# Patient Record
Sex: FEMALE | Race: WHITE | NOT HISPANIC OR LATINO | Employment: FULL TIME | URBAN - METROPOLITAN AREA
[De-identification: names, ages, dates, MRNs, and addresses within clinical notes are randomized per-mention and may not be internally consistent; named-entity substitution may affect disease eponyms.]

---

## 2017-04-04 ENCOUNTER — HISTORICAL (OUTPATIENT)
Dept: ADMINISTRATIVE | Facility: HOSPITAL | Age: 37
End: 2017-04-04

## 2017-04-05 ENCOUNTER — TELEPHONE (OUTPATIENT)
Dept: TRANSPLANT | Facility: CLINIC | Age: 37
End: 2017-04-05

## 2017-04-10 ENCOUNTER — TELEPHONE (OUTPATIENT)
Dept: TRANSPLANT | Facility: CLINIC | Age: 37
End: 2017-04-10

## 2017-04-10 NOTE — TELEPHONE ENCOUNTER
Follow up to call and instructions by Sana for pt to report to the ER for the pain she was having. Pt has not been seen at [facility] since  2015. Robinsonm.

## 2017-04-10 NOTE — TELEPHONE ENCOUNTER
----- Message from Himanshu Russell sent at 4/5/2017  4:04 PM CDT -----  Pt called to say that she is a lot of apin and would like to been soon by Dr. seal

## 2017-04-11 ENCOUNTER — DOCUMENTATION ONLY (OUTPATIENT)
Dept: TRANSPLANT | Facility: CLINIC | Age: 37
End: 2017-04-11

## 2017-04-11 NOTE — NURSING
Spoke to pt re her recent ER visit for pain.   Imaging was negative. Pt had colonoscopy due to complaints of nausea, diarrhea, weight loss. I informed her that these are not symptoms of FNH. All documentation by Dr Acevedo in 2015 notes pain not from FNH.  I spoke to the referring, the are pending colon bx report.  PT chart will be rev. With Dr Ruff but informed pt others causes of her symptoms should be investigated.  Pt agreed.

## 2017-04-13 ENCOUNTER — HISTORICAL (OUTPATIENT)
Dept: RADIOLOGY | Facility: HOSPITAL | Age: 37
End: 2017-04-13

## 2017-04-19 ENCOUNTER — HISTORICAL (OUTPATIENT)
Dept: RADIOLOGY | Facility: HOSPITAL | Age: 37
End: 2017-04-19

## 2017-05-11 ENCOUNTER — TELEPHONE (OUTPATIENT)
Dept: TRANSPLANT | Facility: CLINIC | Age: 37
End: 2017-05-11

## 2017-07-20 ENCOUNTER — HISTORICAL (OUTPATIENT)
Dept: ADMINISTRATIVE | Facility: HOSPITAL | Age: 37
End: 2017-07-20

## 2017-07-21 ENCOUNTER — HISTORICAL (OUTPATIENT)
Dept: RADIOLOGY | Facility: HOSPITAL | Age: 37
End: 2017-07-21

## 2017-07-21 LAB — POC CREATININE: 0.7 MG/DL (ref 0.6–1.3)

## 2017-07-31 ENCOUNTER — TELEPHONE (OUTPATIENT)
Dept: TRANSPLANT | Facility: CLINIC | Age: 37
End: 2017-07-31

## 2017-07-31 NOTE — TELEPHONE ENCOUNTER
----- Message from Cyndy Arreguin sent at 7/31/2017  8:04 AM CDT -----  Contact: patient   Patient states she has been calling to get her appt scheduled. Please call

## 2017-07-31 NOTE — TELEPHONE ENCOUNTER
Patient called in reference to her complaint of abdominal pain.  Patient reports having pain in her mid back that travel upward.  Patient does not report ant  dark urine change in skin color or distended abdomen.  Patient reports that she has not seen her PCP in a while.  Patient instructed to see PCP about symptoms and get current labs and imaging for review.  Patient told to call after this has been done if necessary.

## 2017-08-23 ENCOUNTER — HISTORICAL (OUTPATIENT)
Dept: ADMINISTRATIVE | Facility: HOSPITAL | Age: 37
End: 2017-08-23

## 2017-10-31 ENCOUNTER — HISTORICAL (OUTPATIENT)
Dept: RADIOLOGY | Facility: HOSPITAL | Age: 37
End: 2017-10-31

## 2019-03-19 ENCOUNTER — HISTORICAL (OUTPATIENT)
Dept: LAB | Facility: HOSPITAL | Age: 39
End: 2019-03-19

## 2019-03-19 LAB
ABS NEUT (OLG): 3.16 X10(3)/MCL (ref 2.1–9.2)
ALBUMIN SERPL-MCNC: 4.2 GM/DL (ref 3.4–5)
ALBUMIN/GLOB SERPL: 1.2 {RATIO}
ALP SERPL-CCNC: 119 UNIT/L (ref 38–126)
ALT SERPL-CCNC: 70 UNIT/L (ref 12–78)
AST SERPL-CCNC: 22 UNIT/L (ref 15–37)
BASOPHILS # BLD AUTO: 0 X10(3)/MCL (ref 0–0.2)
BASOPHILS NFR BLD AUTO: 0 %
BILIRUB SERPL-MCNC: 0.5 MG/DL (ref 0.2–1)
BILIRUBIN DIRECT+TOT PNL SERPL-MCNC: 0.1 MG/DL (ref 0–0.2)
BILIRUBIN DIRECT+TOT PNL SERPL-MCNC: 0.4 MG/DL (ref 0–0.8)
BUN SERPL-MCNC: 13 MG/DL (ref 7–18)
CALCIUM SERPL-MCNC: 8.5 MG/DL (ref 8.5–10.1)
CHLORIDE SERPL-SCNC: 105 MMOL/L (ref 98–107)
CO2 SERPL-SCNC: 27 MMOL/L (ref 21–32)
CREAT SERPL-MCNC: 0.83 MG/DL (ref 0.55–1.02)
CRP SERPL HS-MCNC: 1.06 MG/L (ref 0–3)
EOSINOPHIL # BLD AUTO: 0.2 X10(3)/MCL (ref 0–0.9)
EOSINOPHIL NFR BLD AUTO: 3 %
ERYTHROCYTE [DISTWIDTH] IN BLOOD BY AUTOMATED COUNT: 12 % (ref 11.5–17)
GLOBULIN SER-MCNC: 3.5 GM/DL (ref 2.4–3.5)
GLUCOSE SERPL-MCNC: 78 MG/DL (ref 74–106)
HCT VFR BLD AUTO: 44.4 % (ref 37–47)
HGB BLD-MCNC: 14.3 GM/DL (ref 12–16)
LYMPHOCYTES # BLD AUTO: 2.1 X10(3)/MCL (ref 0.6–4.6)
LYMPHOCYTES NFR BLD AUTO: 36 %
MCH RBC QN AUTO: 32.8 PG (ref 27–31)
MCHC RBC AUTO-ENTMCNC: 32.2 GM/DL (ref 33–36)
MCV RBC AUTO: 101.8 FL (ref 80–94)
MONOCYTES # BLD AUTO: 0.4 X10(3)/MCL (ref 0.1–1.3)
MONOCYTES NFR BLD AUTO: 6 %
NEUTROPHILS # BLD AUTO: 3.16 X10(3)/MCL (ref 2.1–9.2)
NEUTROPHILS NFR BLD AUTO: 54 %
PLATELET # BLD AUTO: 193 X10(3)/MCL (ref 130–400)
PMV BLD AUTO: 10.9 FL (ref 9.4–12.4)
POTASSIUM SERPL-SCNC: 3.5 MMOL/L (ref 3.5–5.1)
PROT SERPL-MCNC: 7.7 GM/DL (ref 6.4–8.2)
RBC # BLD AUTO: 4.36 X10(6)/MCL (ref 4.2–5.4)
SODIUM SERPL-SCNC: 139 MMOL/L (ref 136–145)
WBC # SPEC AUTO: 5.8 X10(3)/MCL (ref 4.5–11.5)

## 2019-04-08 ENCOUNTER — HISTORICAL (OUTPATIENT)
Dept: SURGERY | Facility: HOSPITAL | Age: 39
End: 2019-04-08

## 2022-04-10 ENCOUNTER — HISTORICAL (OUTPATIENT)
Dept: ADMINISTRATIVE | Facility: HOSPITAL | Age: 42
End: 2022-04-10
Payer: COMMERCIAL

## 2022-04-24 VITALS
HEIGHT: 66 IN | DIASTOLIC BLOOD PRESSURE: 78 MMHG | WEIGHT: 145 LBS | BODY MASS INDEX: 23.3 KG/M2 | SYSTOLIC BLOOD PRESSURE: 116 MMHG

## 2022-05-04 ENCOUNTER — OFFICE VISIT (OUTPATIENT)
Dept: NEUROLOGY | Facility: CLINIC | Age: 42
End: 2022-05-04
Payer: COMMERCIAL

## 2022-05-04 VITALS
SYSTOLIC BLOOD PRESSURE: 122 MMHG | DIASTOLIC BLOOD PRESSURE: 76 MMHG | WEIGHT: 130 LBS | BODY MASS INDEX: 20.89 KG/M2 | HEIGHT: 66 IN

## 2022-05-04 DIAGNOSIS — F51.01 PRIMARY INSOMNIA: ICD-10-CM

## 2022-05-04 DIAGNOSIS — G44.011 INTRACTABLE EPISODIC CLUSTER HEADACHE: Primary | ICD-10-CM

## 2022-05-04 DIAGNOSIS — G43.701 CHRONIC MIGRAINE WITHOUT AURA WITH STATUS MIGRAINOSUS, NOT INTRACTABLE: ICD-10-CM

## 2022-05-04 PROCEDURE — 3074F PR MOST RECENT SYSTOLIC BLOOD PRESSURE < 130 MM HG: ICD-10-PCS | Mod: CPTII,S$GLB,, | Performed by: NURSE PRACTITIONER

## 2022-05-04 PROCEDURE — 3078F PR MOST RECENT DIASTOLIC BLOOD PRESSURE < 80 MM HG: ICD-10-PCS | Mod: CPTII,S$GLB,, | Performed by: NURSE PRACTITIONER

## 2022-05-04 PROCEDURE — 99214 OFFICE O/P EST MOD 30 MIN: CPT | Mod: S$GLB,,, | Performed by: NURSE PRACTITIONER

## 2022-05-04 PROCEDURE — 1159F MED LIST DOCD IN RCRD: CPT | Mod: CPTII,S$GLB,, | Performed by: NURSE PRACTITIONER

## 2022-05-04 PROCEDURE — 1160F PR REVIEW ALL MEDS BY PRESCRIBER/CLIN PHARMACIST DOCUMENTED: ICD-10-PCS | Mod: CPTII,S$GLB,, | Performed by: NURSE PRACTITIONER

## 2022-05-04 PROCEDURE — 3074F SYST BP LT 130 MM HG: CPT | Mod: CPTII,S$GLB,, | Performed by: NURSE PRACTITIONER

## 2022-05-04 PROCEDURE — 99999 PR PBB SHADOW E&M-EST. PATIENT-LVL III: ICD-10-PCS | Mod: PBBFAC,,, | Performed by: NURSE PRACTITIONER

## 2022-05-04 PROCEDURE — 1160F RVW MEDS BY RX/DR IN RCRD: CPT | Mod: CPTII,S$GLB,, | Performed by: NURSE PRACTITIONER

## 2022-05-04 PROCEDURE — 3008F BODY MASS INDEX DOCD: CPT | Mod: CPTII,S$GLB,, | Performed by: NURSE PRACTITIONER

## 2022-05-04 PROCEDURE — 3008F PR BODY MASS INDEX (BMI) DOCUMENTED: ICD-10-PCS | Mod: CPTII,S$GLB,, | Performed by: NURSE PRACTITIONER

## 2022-05-04 PROCEDURE — 1159F PR MEDICATION LIST DOCUMENTED IN MEDICAL RECORD: ICD-10-PCS | Mod: CPTII,S$GLB,, | Performed by: NURSE PRACTITIONER

## 2022-05-04 PROCEDURE — 99999 PR PBB SHADOW E&M-EST. PATIENT-LVL III: CPT | Mod: PBBFAC,,, | Performed by: NURSE PRACTITIONER

## 2022-05-04 PROCEDURE — 3078F DIAST BP <80 MM HG: CPT | Mod: CPTII,S$GLB,, | Performed by: NURSE PRACTITIONER

## 2022-05-04 PROCEDURE — 99214 PR OFFICE/OUTPT VISIT, EST, LEVL IV, 30-39 MIN: ICD-10-PCS | Mod: S$GLB,,, | Performed by: NURSE PRACTITIONER

## 2022-05-04 RX ORDER — ASPIRIN 81 MG/1
81 TABLET ORAL
COMMUNITY

## 2022-05-04 RX ORDER — GALCANEZUMAB 100 MG/ML
300 INJECTION, SOLUTION SUBCUTANEOUS
COMMUNITY
Start: 2021-11-01 | End: 2022-11-10

## 2022-05-04 RX ORDER — VENLAFAXINE HYDROCHLORIDE 150 MG/1
150 CAPSULE, EXTENDED RELEASE ORAL
COMMUNITY

## 2022-05-04 RX ORDER — CEFUROXIME AXETIL 250 MG/1
6 TABLET ORAL
COMMUNITY

## 2022-05-04 RX ORDER — ZOLPIDEM TARTRATE 12.5 MG/1
12.5 TABLET, FILM COATED, EXTENDED RELEASE ORAL NIGHTLY PRN
Qty: 30 TABLET | Refills: 2 | Status: SHIPPED | OUTPATIENT
Start: 2022-05-04 | End: 2022-08-03

## 2022-05-04 RX ORDER — ESTRADIOL 0.04 MG/D
1 FILM, EXTENDED RELEASE TRANSDERMAL
COMMUNITY
End: 2022-05-23 | Stop reason: SDUPTHER

## 2022-05-04 RX ORDER — ESTRADIOL 0.1 MG/D
PATCH, EXTENDED RELEASE TRANSDERMAL
COMMUNITY
Start: 2022-05-02

## 2022-05-04 RX ORDER — LISDEXAMFETAMINE DIMESYLATE 70 MG/1
CAPSULE ORAL
COMMUNITY
Start: 2022-05-02 | End: 2023-10-16

## 2022-05-04 RX ORDER — TOPIRAMATE 50 MG/1
1 CAPSULE, EXTENDED RELEASE ORAL DAILY
COMMUNITY
Start: 2022-04-20 | End: 2022-05-04

## 2022-05-04 RX ORDER — TOPIRAMATE 100 MG/1
CAPSULE, EXTENDED RELEASE ORAL
COMMUNITY
End: 2022-08-22

## 2022-05-04 NOTE — PROGRESS NOTES
"Subjective:       Patient ID: Mary Bracenas is a 42 y.o. female.    Chief Complaint: Headache (Pt states she continues to suffer with headaches)    41 y/o female - routine FU for cluster headaches    Headache:   Pt reports 4-8 headaches per week, > 15 HA days per month    States she gets a sharp pounding unilateral pain on the left side of her head with phonophobia lasting 1-2 hrs; some headaches lasting all day, some > 4 hrs, and some longer than 72 hours despite rescue medication.     Denies n/v or photophobia.     States these headaches are more migrainous in nature and not her typical "cluster type"; CGRP for rescue helps most of the time - ; triptan as a last resort given undesirable SEs she experiences with triptan use.     Pt states she thinks her TMJ/bruxism is a trigger for some of the HAs; Pt is doing dry needling and PT and it is helping her TMJ but not the HA's. She also uses a mouthguard. She has taken Flexeril in the past and tolerated well and admits it was efficacious in relieving the pain associated with TMJ/bruxism.      Current preventative regimen:  Emgality 300 mg monthly (cluster HA)    Current rescue:  Nurtec 75 mg  Sumatriptan injection (undesirable SEs) so rarely takes    Prior preventative medications:  Topamax  Effexor  Gabapentin  O2 therapy    Imaging:  (07/2017) MRI brain w w/o at [facility] - nothing acute/worrisome  (07/2017) MRA head at [facility] - negative study   (07/2017) MRI C spine at [facility] - nothing worrisome in Dr. Foster's opinion         Review of Systems   Constitutional: Negative.    HENT: Positive for dental problem.         TMJ/Bruxism  Reports phonophobia   Eyes: Negative for photophobia.   Neurological: Positive for headaches.         Objective:      Physical Exam  Vitals reviewed.   Constitutional:       General: She is awake.      Appearance: Normal appearance. She is well-developed.   Eyes:      General: Lids are normal. Gaze aligned appropriately. No " visual field deficit.     Extraocular Movements: Extraocular movements intact.      Conjunctiva/sclera: Conjunctivae normal.      Pupils: Pupils are equal, round, and reactive to light.      Funduscopic exam:     Right eye: No papilledema.         Left eye: No papilledema.      Visual Fields: Right eye visual fields normal and left eye visual fields normal.   Neurological:      General: No focal deficit present.      Mental Status: She is alert and oriented to person, place, and time. Mental status is at baseline.      Cranial Nerves: Cranial nerves are intact.      Sensory: Sensation is intact.      Motor: Motor function is intact.      Coordination: Coordination is intact.      Gait: Gait is intact.      Deep Tendon Reflexes: Reflexes are normal and symmetric.   Psychiatric:         Attention and Perception: Attention and perception normal.         Speech: Speech normal.         Behavior: Behavior is cooperative.         Cognition and Memory: Cognition and memory normal.         Judgment: Judgment normal.         Assessment:       Problem List Items Addressed This Visit    None     Visit Diagnoses     Intractable episodic cluster headache    -  Primary    Chronic migraine without aura with status migrainosus, not intractable        Primary insomnia              Plan:       Cluster Headache:  -Continue the Emaglity 300 mg monthly injections  -Continue the Nurtec 75 mg once per day as needed  -Avoid daily use of NSAIDs  -Avoid dehydration  -Add daily Magnesium supplementation, 200-400 mg at bed time     Chr migraines  -Start Botox approval process  -see HA sheet that has been scanned into the EMR for additional information   -RTC once the Botox has been approved  -considered Rx for Flexeril 5 mg tab, 1 tab daily as needed for TMJ/bruxism;  collectively decided to hold off for now.     -Medication administration personally reviewed with patient by MD/provider.     potential or actual medication changes discussed.  Common and potentially  serious side effects of medications or medication changes discussed.    Insomnia:  -Continue the Ambien CR 12.5 mg at bed time as needed

## 2022-05-11 DIAGNOSIS — G43.709 CHRONIC MIGRAINE WITHOUT AURA: Primary | ICD-10-CM

## 2022-05-23 ENCOUNTER — PROCEDURE VISIT (OUTPATIENT)
Dept: NEUROLOGY | Facility: CLINIC | Age: 42
End: 2022-05-23
Payer: COMMERCIAL

## 2022-05-23 VITALS
WEIGHT: 130 LBS | DIASTOLIC BLOOD PRESSURE: 60 MMHG | SYSTOLIC BLOOD PRESSURE: 110 MMHG | BODY MASS INDEX: 20.89 KG/M2 | HEIGHT: 66 IN

## 2022-05-23 DIAGNOSIS — G43.E09 CHRONIC MIGRAINE WITH AURA: Primary | ICD-10-CM

## 2022-05-23 PROCEDURE — 64615 PR CHEMODENERVATION OF MUSCLE FOR CHRONIC MIGRAINE: ICD-10-PCS | Mod: S$GLB,,, | Performed by: NURSE PRACTITIONER

## 2022-05-23 PROCEDURE — 64615 CHEMODENERV MUSC MIGRAINE: CPT | Mod: S$GLB,,, | Performed by: NURSE PRACTITIONER

## 2022-05-23 NOTE — PROCEDURES
Initial botox treatment today; pt reports that she has not had any cluster headaches recent, but does continue to have migraine headaches at least 3x/wk    Botox:  32 g half inch needle  concentration of botulinum 2cc/100u  5u each site, including four frontalis sites, procerus, corrugators, four temporalis sites B; three sites each trapezius; two each side cervical paraspinals; three each occipitalis for total of 155u; remainder of 200u wasted tolerated well without immediate complications noted    **Pt. Supply**  **Consent for Botox treatment form signed**

## 2022-05-24 DIAGNOSIS — G43.709 CHRONIC MIGRAINE WITHOUT AURA WITHOUT STATUS MIGRAINOSUS, NOT INTRACTABLE: Primary | ICD-10-CM

## 2022-05-24 RX ORDER — TOPIRAMATE 50 MG/1
CAPSULE, EXTENDED RELEASE ORAL
Qty: 30 CAPSULE | Refills: 3 | Status: SHIPPED | OUTPATIENT
Start: 2022-05-24 | End: 2022-09-27

## 2022-05-30 NOTE — TELEPHONE ENCOUNTER
----- Message from Rogelio Hogue sent at 4/5/2017  3:00 PM CDT -----  Contact: patient  States that a referral was sent in today calling to schedule appt please call    No

## 2022-08-22 ENCOUNTER — PROCEDURE VISIT (OUTPATIENT)
Dept: NEUROLOGY | Facility: CLINIC | Age: 42
End: 2022-08-22
Payer: COMMERCIAL

## 2022-08-22 VITALS
BODY MASS INDEX: 20.89 KG/M2 | WEIGHT: 130 LBS | SYSTOLIC BLOOD PRESSURE: 108 MMHG | DIASTOLIC BLOOD PRESSURE: 74 MMHG | HEIGHT: 66 IN

## 2022-08-22 DIAGNOSIS — G43.709 CHRONIC MIGRAINE WITHOUT AURA WITHOUT STATUS MIGRAINOSUS, NOT INTRACTABLE: Primary | ICD-10-CM

## 2022-08-22 PROCEDURE — 64615 CHEMODENERV MUSC MIGRAINE: CPT | Mod: S$GLB,,, | Performed by: NURSE PRACTITIONER

## 2022-08-22 PROCEDURE — 64615 PR CHEMODENERVATION OF MUSCLE FOR CHRONIC MIGRAINE: ICD-10-PCS | Mod: S$GLB,,, | Performed by: NURSE PRACTITIONER

## 2022-08-22 RX ORDER — METHYLPHENIDATE HYDROCHLORIDE 20 MG/1
20 TABLET ORAL DAILY
COMMUNITY
Start: 2022-07-21

## 2022-08-22 RX ORDER — BUSPIRONE HYDROCHLORIDE 5 MG/1
5 TABLET ORAL 3 TIMES DAILY
COMMUNITY
Start: 2022-05-30 | End: 2023-10-16

## 2022-08-22 RX ORDER — ALPRAZOLAM 0.5 MG/1
0.5 TABLET ORAL
COMMUNITY
Start: 2022-06-30 | End: 2023-10-16

## 2022-08-22 NOTE — PROCEDURES
Procedure Note:  BOTOX Injections For Chronic Migraine    SUBJECTIVE:  Patient ID: Mary Barcenas  Chief Complaint: migraine f/u; botox 200u (MD supply) (Here for migraine f/u; botox 200u (MD supply))      History of Present Illness:  Mary Barcenas is a 42 y.o. female who presents to clinic for follow-up of headaches and Botox injections.     The patient has chronic migraines (G43.719) and prior to BOTOX, would suffer from headaches more than 15 days a month. Headaches would last more than 4 hours a day with no relief of symptoms despite trying multiple medications     Currently, migraines are well controlled with Q3mo BOTOX injections.    Number of migraines per month 3      Diagnosis entered: yes      [id number] entered: yes     J code entered: yes   BOTOX (OnabotulinumtoxinA)    XEOMIN (IncobotulinumtoxinA)       Current Medications:    Current Outpatient Medications:     ALPRAZolam (XANAX) 0.5 MG tablet, Take 0.5 mg by mouth as needed., Disp: , Rfl:     aspirin (ECOTRIN) 81 MG EC tablet, Take 81 mg by mouth., Disp: , Rfl:     busPIRone (BUSPAR) 5 MG Tab, Take 5 mg by mouth 3 (three) times daily., Disp: , Rfl:     LYNN 0.1 mg/24 hr PTSW, , Disp: , Rfl:     galcanezumab-gnlm (GALCANEZUMAB-GNLM) 300 mg/3 mL (100 mg/mL x 3) Syrg, Inject 300 mg into the skin., Disp: , Rfl:     methylphenidate HCl (RITALIN) 20 MG tablet, Take 20 mg by mouth once daily., Disp: , Rfl:     sumatriptan (IMITREX STATDOSE) 6 mg/0.5 mL kit, Inject 6 mg into the skin., Disp: , Rfl:     TROKENDI XR 50 mg Cp24, TAKE ONE CAPSULE BY MOUTH EVERY DAY, Disp: 30 capsule, Rfl: 3    venlafaxine (EFFEXOR-XR) 150 MG Cp24, Take 150 mg by mouth., Disp: , Rfl:     VYVANSE 70 mg capsule, TAKE ONE CAPSULE EVERY MORNING FOR adhd, Disp: , Rfl:     zolpidem (AMBIEN CR) 12.5 MG CR tablet, TAKE ONE TABLET BY MOUTH AT BEDTIME, Disp: 30 tablet, Rfl: 2      OBJECTIVE:  Vitals:  /74 (BP Location: Left arm, Patient Position:  "Sitting)   Ht 5' 6" (1.676 m)   Wt 59 kg (130 lb)   BMI 20.98 kg/m²     Physical Exam:  Constitutional: she appears well-developed and well-nourished. she is well groomed. NAD     PROCEDURE NOTE:  BOTOX was performed as an indicated therapy for intractable chronic migraine headaches given that the patient failed more than 2 headache medications    A time out was conducted just before the start of the procedure to verify the correct patient and procedure, procedure location, and all relevant critical information.     Botulinum Toxin Injection Procedure     PROCEDURE PERFORMED: Botulinum toxin injection (97742)  CLINICAL INDICATION: G43.719  After risks and benefits were explained including bleeding, infection, worsening of pain, damage to the areas being injected, weakness of muscles, loss of muscle control, dysphagia if injecting the head or neck, facial droop if injecting the facial area, painful injection, allergic or other reaction to the medications being injected, and the failure of the procedure to help the problem, a signed consent was obtained.   The patient was placed in a comfortable area and the sites to be treated were identified.The area to be treated was prepped three times with alcohol and the alcohol allowed to dry. Next, a 30 gauge needle was used to inject the medication in the area to be treated.      Total Botox used: 155 Units   Unavoidable waste: 45 Units     Injection sites:    muscle bilaterally ( a total of 10 units divided into 2 sites)   Procerus muscle (5 units)   Frontalis muscle bilaterally (a total of 20 units divided into 4 sites)   Temporalis muscle bilaterally (a total of 40 units divided into 8 sites)   Occipitalis muscle bilaterally (a total of 30 units divided into 6 sites)   Cervical paraspinal muscles (a total of 20 units divided into 4 sites)   Trapezius muscle bilaterally (a total of 30 units divided into 6 sites)   Complications: none     ASSESSMENT/ " PLAN:    Active Problem List with Overview Notes    Diagnosis Date Noted    Chronic migraine with aura 05/23/2022    Abdominal pain 05/08/2015    Liver mass 04/30/2015     Outside MRI form 4/27/15 showed 8.6 cm lesion, segment II and III with early arterial enhancement.           - Botox administered in clinic for Chronic Migraine      - RTC in 3 mo for repeat Botox injections or sooner if needed              Questions and concerns were sought and answered to the patient's stated verbal satisfaction.    The patient verbalizes understanding and agreement with the above stated treatment plan.   Dr. Foster was available during today's encounter.           Pranay Cabrera, MSN, APRN, AGACNP-BC

## 2022-11-10 DIAGNOSIS — G43.E09 CHRONIC MIGRAINE WITH AURA: Primary | ICD-10-CM

## 2022-11-10 RX ORDER — GALCANEZUMAB 100 MG/ML
INJECTION, SOLUTION SUBCUTANEOUS
Qty: 3 ML | Refills: 2 | Status: SHIPPED | OUTPATIENT
Start: 2022-11-10 | End: 2023-02-24

## 2022-11-21 ENCOUNTER — PROCEDURE VISIT (OUTPATIENT)
Dept: NEUROLOGY | Facility: CLINIC | Age: 42
End: 2022-11-21
Payer: COMMERCIAL

## 2022-11-21 VITALS
DIASTOLIC BLOOD PRESSURE: 70 MMHG | BODY MASS INDEX: 21.69 KG/M2 | SYSTOLIC BLOOD PRESSURE: 118 MMHG | WEIGHT: 135 LBS | HEIGHT: 66 IN

## 2022-11-21 DIAGNOSIS — G47.00 INSOMNIA, UNSPECIFIED TYPE: ICD-10-CM

## 2022-11-21 DIAGNOSIS — G43.709 CHRONIC MIGRAINE WITHOUT AURA WITHOUT STATUS MIGRAINOSUS, NOT INTRACTABLE: Primary | ICD-10-CM

## 2022-11-21 PROCEDURE — 64615 CHEMODENERV MUSC MIGRAINE: CPT | Mod: S$GLB,,, | Performed by: NURSE PRACTITIONER

## 2022-11-21 PROCEDURE — 64615 PR CHEMODENERVATION OF MUSCLE FOR CHRONIC MIGRAINE: ICD-10-PCS | Mod: S$GLB,,, | Performed by: NURSE PRACTITIONER

## 2022-11-21 RX ORDER — ZOLPIDEM TARTRATE 12.5 MG/1
TABLET, FILM COATED, EXTENDED RELEASE ORAL
Qty: 30 TABLET | Refills: 2 | Status: SHIPPED | OUTPATIENT
Start: 2022-11-21 | End: 2023-02-24

## 2022-11-21 RX ORDER — TOPIRAMATE 50 MG/1
1 CAPSULE, EXTENDED RELEASE ORAL DAILY
Qty: 30 EACH | Refills: 3 | Status: SHIPPED | OUTPATIENT
Start: 2022-11-21 | End: 2023-06-27

## 2022-11-21 NOTE — PROCEDURES
Procedure Note:  BOTOX Injections For Chronic Migraine    SUBJECTIVE:    Patient ID: Mary Barcenas  Chief Complaint: Botulinum Toxin Injection (Botox for migraines (MD supply); botox cont to be beneficial. States now only gets HA approx 2 wks prior to Botox due; otherwise: none    Avg headache days per month: 2; reports recent dental work, having root canal done, that has triggered inc in headaches in the last 2 wks; denies episodes of cluster headaches      History of Present Illness:  Mary Barcenas is a 42 y.o. female who presents to clinic for follow-up of headaches and Botox injections.     The patient has chronic migraines (G43.719) and prior to BOTOX, would suffer from headaches more than 15 days a month. Headaches would last more than 4 hours a day with no relief of symptoms despite trying multiple medications     Currently, migraines are well controlled with Q3mo BOTOX injections plus emgality     Number of migraines per month 2    Review of Systems - A review of 10+ systems was conducted with pertinent positive and negative findings documented in HPI with all other systems reviewed and negative.    PFSH: Past medical, family, and social history reviewed as documented in chart with pertinent positive medical, family, and social history detailed in HPI.    Current Medications:    Current Outpatient Medications:     ALPRAZolam (XANAX) 0.5 MG tablet, Take 0.5 mg by mouth as needed., Disp: , Rfl:     aspirin (ECOTRIN) 81 MG EC tablet, Take 81 mg by mouth., Disp: , Rfl:     LYNN 0.1 mg/24 hr PTSW, , Disp: , Rfl:     GALCANEZUMAB-GNLM 300 mg/3 mL (100 mg/mL x 3) Syringe, INJECT 300 MGS UNDER THE SKIN EVERY MONTH, Disp: 3 mL, Rfl: 2    methylphenidate HCl (RITALIN) 20 MG tablet, Take 20 mg by mouth once daily. Takes in the afternoon prn (not daily), Disp: , Rfl:     sumatriptan (IMITREX STATDOSE) 6 mg/0.5 mL kit, Inject 6 mg into the skin., Disp: , Rfl:     topiramate (QUDEXY XR) 50 mg CSpX,  "TAKE ONE CAPSULE BY MOUTH EVERY DAY, Disp: 30 each, Rfl: 3    venlafaxine (EFFEXOR-XR) 150 MG Cp24, Take 150 mg by mouth., Disp: , Rfl:     VYVANSE 70 mg capsule, TAKE ONE CAPSULE EVERY MORNING FOR adhd, Disp: , Rfl:     zolpidem (AMBIEN CR) 12.5 MG CR tablet, TAKE ONE TABLET BY MOUTH AT BEDTIME, Disp: 30 tablet, Rfl: 2    busPIRone (BUSPAR) 5 MG Tab, Take 5 mg by mouth 3 (three) times daily., Disp: , Rfl:       OBJECTIVE  Vitals:  /70   Ht 5' 6" (1.676 m)   Wt 61.2 kg (135 lb)   BMI 21.79 kg/m²     Physical Exam:  Constitutional: she appears well-developed and well-nourished. she is well groomed. NAD     Imaging:  Results for orders placed or performed in visit on 10/31/17   MRA Brain without contrast    Narrative    MR Angio Head W/O Contrast     REASON FOR STUDY: g44.009 Cluster headache syndrome, unspecified, not  intractable;Other (please specify)     COMPARISON: None.     TECHNIQUE: 3-D time-of-flight sequence was performed through the brain  without contrast.        FINDINGS:     Carotid siphons, basilar artery and distal vertebral arteries are  widely patent. Intracranially, there is no aneurysm, major branch  occlusion or segmental stenosis.           Impression:     Negative study.        Electronically Signed By: Miguelangel Reynoso MD  Date/Time Signed: 10/31/2017 09:56   Results for orders placed or performed in visit on 07/21/17   MRI Brain W WO Contrast    Narrative    MRI Brain W W/O Contrast     REASON FOR STUDY: L hemiparesis/ptosis;Other (please specify) 37 years  Female      COMPARISON: None.     TECHNIQUE: Multiplanar imaging was performed through the cranial  region using T1, T2, FLAIR, T2 gradient echo, diffusion and ADC map  sequences.  Study was done with and without contrast.        FINDINGS:     There is a subcentimeter right frontal subcortical white matter  discrete T2 hyperintensity. There is no enhancement. No other abnormal  intracranial signal is seen. There is no abnormal " enhancement. The  ventricles and subarachnoid spaces are normal. There is no mass effect  or extra-axial fluid collection. There is no restricted diffusion.  Craniovertebral junction and sella turcica are normal.     Impression:     Nonspecific right frontal subcortical white matter T2 hyperintensity.  Considerations include migraine headaches, nonspecific demyelination  or chronic ischemia.        Electronically Signed By: Miguelangel Reynoso MD  Date/Time Signed: 07/21/2017 16:02         PROCEDURE NOTE:  BOTOX was performed as an indicated therapy for intractable chronic migraine headaches given that the patient failed more than 2 headache medications    A time out was conducted just before the start of the procedure to verify the correct patient and procedure, procedure location, and all relevant critical information.     Botulinum Toxin Injection Procedure     PROCEDURE PERFORMED: Botulinum toxin injection (15100)  CLINICAL INDICATION: G43.719  After risks and benefits were explained including bleeding, infection, worsening of pain, damage to the areas being injected, weakness of muscles, loss of muscle control, dysphagia if injecting the head or neck, facial droop if injecting the facial area, painful injection, allergic or other reaction to the medications being injected, and the failure of the procedure to help the problem, a signed consent was obtained.   The patient was placed in a comfortable area and the sites to be treated were identified.The area to be treated was prepped three times with alcohol and the alcohol allowed to dry. Next, a 30 gauge needle was used to inject the medication in the area to be treated.      Total Botox used: 155 Units   Unavoidable waste: 45 Units     Injection sites:    muscle bilaterally ( a total of 10 units divided into 2 sites)   Procerus muscle (5 units)   Frontalis muscle bilaterally (a total of 20 units divided into 4 sites)   Temporalis muscle bilaterally (a  total of 40 units divided into 8 sites)   Occipitalis muscle bilaterally (a total of 30 units divided into 6 sites)   Cervical paraspinal muscles (a total of 20 units divided into 4 sites)   Trapezius muscle bilaterally (a total of 30 units divided into 6 sites)   Complications: none       Diagnosis entered: yes      [id number] entered: yes     J code entered: yes                                       Botox ordered:yes      ASSESSMENT/ PLAN:    Active Problem List with Overview Notes    Diagnosis Date Noted    Chronic migraine without aura without status migrainosus, not intractable 11/21/2022    Chronic migraine with aura 05/23/2022    Abdominal pain 05/08/2015    Liver mass 04/30/2015     Outside MRI form 4/27/15 showed 8.6 cm lesion, segment II and III with early arterial enhancement.           - Botox administered in clinic for Chronic Migraine      - RTC in 3 mo for repeat Botox injections or sooner if needed              Questions and concerns were sought and answered to the patient's stated verbal satisfaction.    The patient verbalizes understanding and agreement with the above stated treatment plan.   Dr. Foster was available during today's encounter.           Pranay Cabrera, MSN, APRN, AGACNP-BC

## 2022-11-21 NOTE — ASSESSMENT & PLAN NOTE
Continue Botox injections every three months    Continue the Emgality 120 mg once per month [every 28-30 days]

## 2023-05-25 DIAGNOSIS — G43.E09 CHRONIC MIGRAINE WITH AURA: ICD-10-CM

## 2023-05-25 RX ORDER — GALCANEZUMAB 100 MG/ML
INJECTION, SOLUTION SUBCUTANEOUS
Qty: 3 ML | Refills: 2 | Status: SHIPPED | OUTPATIENT
Start: 2023-05-25 | End: 2023-09-27 | Stop reason: SDUPTHER

## 2023-05-27 DIAGNOSIS — G47.00 INSOMNIA, UNSPECIFIED TYPE: ICD-10-CM

## 2023-05-29 RX ORDER — ZOLPIDEM TARTRATE 12.5 MG/1
TABLET, FILM COATED, EXTENDED RELEASE ORAL
Qty: 30 TABLET | Refills: 2 | Status: SHIPPED | OUTPATIENT
Start: 2023-05-29 | End: 2023-09-06 | Stop reason: SDUPTHER

## 2023-06-26 DIAGNOSIS — G43.709 CHRONIC MIGRAINE WITHOUT AURA WITHOUT STATUS MIGRAINOSUS, NOT INTRACTABLE: ICD-10-CM

## 2023-06-27 RX ORDER — TOPIRAMATE 50 MG/1
CAPSULE, EXTENDED RELEASE ORAL
Qty: 90 EACH | Refills: 3 | Status: SHIPPED | OUTPATIENT
Start: 2023-06-27 | End: 2023-10-16

## 2023-09-06 DIAGNOSIS — G47.00 INSOMNIA, UNSPECIFIED TYPE: ICD-10-CM

## 2023-09-07 RX ORDER — ZOLPIDEM TARTRATE 12.5 MG/1
12.5 TABLET, FILM COATED, EXTENDED RELEASE ORAL NIGHTLY
Qty: 30 TABLET | Refills: 0 | Status: SHIPPED | OUTPATIENT
Start: 2023-09-07 | End: 2023-10-20

## 2023-09-27 ENCOUNTER — TELEPHONE (OUTPATIENT)
Dept: NEUROLOGY | Facility: CLINIC | Age: 43
End: 2023-09-27
Payer: COMMERCIAL

## 2023-09-27 DIAGNOSIS — G43.E09 CHRONIC MIGRAINE WITH AURA: Primary | ICD-10-CM

## 2023-09-27 RX ORDER — GALCANEZUMAB 100 MG/ML
INJECTION, SOLUTION SUBCUTANEOUS
Qty: 3 ML | Refills: 2 | Status: SHIPPED | OUTPATIENT
Start: 2023-09-27 | End: 2023-10-16 | Stop reason: SDUPTHER

## 2023-10-16 ENCOUNTER — OFFICE VISIT (OUTPATIENT)
Dept: NEUROLOGY | Facility: CLINIC | Age: 43
End: 2023-10-16
Payer: COMMERCIAL

## 2023-10-16 VITALS
HEIGHT: 66 IN | WEIGHT: 140 LBS | BODY MASS INDEX: 22.5 KG/M2 | DIASTOLIC BLOOD PRESSURE: 76 MMHG | SYSTOLIC BLOOD PRESSURE: 116 MMHG

## 2023-10-16 DIAGNOSIS — G43.E09 CHRONIC MIGRAINE WITH AURA: ICD-10-CM

## 2023-10-16 DIAGNOSIS — G47.00 INSOMNIA, UNSPECIFIED TYPE: ICD-10-CM

## 2023-10-16 PROCEDURE — 99214 OFFICE O/P EST MOD 30 MIN: CPT | Mod: S$GLB,,, | Performed by: NURSE PRACTITIONER

## 2023-10-16 PROCEDURE — 99999 PR PBB SHADOW E&M-EST. PATIENT-LVL III: CPT | Mod: PBBFAC,,, | Performed by: NURSE PRACTITIONER

## 2023-10-16 PROCEDURE — 3008F BODY MASS INDEX DOCD: CPT | Mod: CPTII,S$GLB,, | Performed by: NURSE PRACTITIONER

## 2023-10-16 PROCEDURE — 3074F PR MOST RECENT SYSTOLIC BLOOD PRESSURE < 130 MM HG: ICD-10-PCS | Mod: CPTII,S$GLB,, | Performed by: NURSE PRACTITIONER

## 2023-10-16 PROCEDURE — 3078F DIAST BP <80 MM HG: CPT | Mod: CPTII,S$GLB,, | Performed by: NURSE PRACTITIONER

## 2023-10-16 PROCEDURE — 1159F MED LIST DOCD IN RCRD: CPT | Mod: CPTII,S$GLB,, | Performed by: NURSE PRACTITIONER

## 2023-10-16 PROCEDURE — 1160F RVW MEDS BY RX/DR IN RCRD: CPT | Mod: CPTII,S$GLB,, | Performed by: NURSE PRACTITIONER

## 2023-10-16 PROCEDURE — 99214 PR OFFICE/OUTPT VISIT, EST, LEVL IV, 30-39 MIN: ICD-10-PCS | Mod: S$GLB,,, | Performed by: NURSE PRACTITIONER

## 2023-10-16 PROCEDURE — 3078F PR MOST RECENT DIASTOLIC BLOOD PRESSURE < 80 MM HG: ICD-10-PCS | Mod: CPTII,S$GLB,, | Performed by: NURSE PRACTITIONER

## 2023-10-16 PROCEDURE — 3008F PR BODY MASS INDEX (BMI) DOCUMENTED: ICD-10-PCS | Mod: CPTII,S$GLB,, | Performed by: NURSE PRACTITIONER

## 2023-10-16 PROCEDURE — 99999 PR PBB SHADOW E&M-EST. PATIENT-LVL III: ICD-10-PCS | Mod: PBBFAC,,, | Performed by: NURSE PRACTITIONER

## 2023-10-16 PROCEDURE — 1159F PR MEDICATION LIST DOCUMENTED IN MEDICAL RECORD: ICD-10-PCS | Mod: CPTII,S$GLB,, | Performed by: NURSE PRACTITIONER

## 2023-10-16 PROCEDURE — 3074F SYST BP LT 130 MM HG: CPT | Mod: CPTII,S$GLB,, | Performed by: NURSE PRACTITIONER

## 2023-10-16 PROCEDURE — 1160F PR REVIEW ALL MEDS BY PRESCRIBER/CLIN PHARMACIST DOCUMENTED: ICD-10-PCS | Mod: CPTII,S$GLB,, | Performed by: NURSE PRACTITIONER

## 2023-10-16 RX ORDER — LEVOTHYROXINE SODIUM 25 UG/1
25 TABLET ORAL
COMMUNITY
Start: 2023-05-31

## 2023-10-16 RX ORDER — SERDEXMETHYLPHENIDATE AND DEXMETHYLPHENIDATE 10.4; 52.3 MG/1; MG/1
1 CAPSULE ORAL EVERY MORNING
COMMUNITY
Start: 2023-09-12

## 2023-10-16 RX ORDER — GALCANEZUMAB 100 MG/ML
INJECTION, SOLUTION SUBCUTANEOUS
Qty: 3 ML | Refills: 11 | Status: SHIPPED | OUTPATIENT
Start: 2023-10-16

## 2023-10-16 RX ORDER — ZOLPIDEM TARTRATE 12.5 MG/1
12.5 TABLET, FILM COATED, EXTENDED RELEASE ORAL NIGHTLY
Qty: 30 TABLET | Refills: 5 | Status: CANCELLED | OUTPATIENT
Start: 2023-10-16

## 2023-10-16 RX ORDER — DEXTROAMPHETAMINE SACCHARATE, AMPHETAMINE ASPARTATE, DEXTROAMPHETAMINE SULFATE AND AMPHETAMINE SULFATE 2.5; 2.5; 2.5; 2.5 MG/1; MG/1; MG/1; MG/1
1 TABLET ORAL
COMMUNITY
Start: 2023-09-18

## 2023-10-16 NOTE — PROGRESS NOTES
Neurology Follow up Note    Subjective:         Patient ID: Mary Barcenas is a 43 y.o. female.    Chief Complaint: 1 yr insomnia, migraine f/u    HPI:            Pt reports headaches well controlled w medication; taking Emgality monthly, c/o 4-5 headache days when she is close to next injection date. Sleeping well w Ambien CR 12.5 mg qhs.     Rarely takes Nurtec    ROS: as per HPI, otherwise pertinent systems review is negative          Past Medical History:   Diagnosis Date    Anxiety and depression     Fatigue     focal nodular hyperplasia     liver mass on MRI from 4/27/15 showed 8.6 cm lesion    Insomnia     Kidney stones     Migraines     Right flank pain     Thyroid disease        Past Surgical History:   Procedure Laterality Date    APPENDECTOMY      AUGMENTATION OF BREAST       SECTION      CHOLECYSTECTOMY      HERNIA REPAIR  2012    HYSTERECTOMY  2013    RECTAL VAGINAL FISTULECTOMY      SINUS SURGERY      TONSILLECTOMY      TUBAL LIGATION         Family History   Problem Relation Age of Onset    Migraines Mother     Migraines Father        Social History     Socioeconomic History    Marital status:    Tobacco Use    Smoking status: Never    Smokeless tobacco: Never    Tobacco comments:     somked occasionally as a teenager   Substance and Sexual Activity    Alcohol use: Yes    Drug use: Never       Review of patient's allergies indicates:   Allergen Reactions    Morphine Swelling     Throat swelling       Current Outpatient Medications   Medication Instructions    aspirin (ECOTRIN) 81 mg, Oral    AZSTARYS 52.3 mg- 10.4 mg Cap 1 capsule, Oral, Every morning    dextroamphetamine-amphetamine 10 mg Tab 1 tablet, Oral    LYNN 0.1 mg/24 hr PTSW No dose, route, or frequency recorded.    galcanezumab-gnlm 300 mg/3 mL (100 mg/mL x 3) Syrg INJECT 3 PENS (300 MG) UNDER THE SKIN EVERY MONTH    levothyroxine (SYNTHROID) 25 mcg, Oral    methylphenidate HCl (RITALIN) 20 mg, Oral, Daily,  "Takes in the afternoon prn (not daily)    sumatriptan (IMITREX STATDOSE) 6 mg, Subcutaneous    venlafaxine (EFFEXOR-XR) 150 mg, Oral    zolpidem (AMBIEN CR) 12.5 mg, Oral, Nightly       Objective:      Exam:   Visit Vitals  /76 (BP Location: Left arm, Patient Position: Sitting)   Ht 5' 6" (1.676 m)   Wt 63.5 kg (140 lb)   BMI 22.60 kg/m²       Physical Exam  Vitals reviewed.   Constitutional:       Appearance: Normal appearance.      Accompanied by: alone   HENT:      Ears:      Comments: Hearing normal.  Eyes:      Extraocular Movements: Extraocular movements intact.   Cardiovascular:      Rate and Rhythm: Normal rate and regular rhythm.   Pulmonary:      Effort: Pulmonary effort is normal.      Breath sounds: Normal breath sounds.   Musculoskeletal:         General: Normal range of motion.   Skin:     General: Skin is warm and dry.   Neurological:      General: No focal deficit present.      Mental Status: alert and oriented to person, place, and time.      Speech: nml     Face: symmetric; tongue midline; cheek puff nml     Motor: nonlateralizing     Gait: unassisted and normal.  Psychiatric:         Mood and Affect: Mood normal.         Behavior: Behavior normal.         Assessment/Plan:   Migraine  Continue the Emgality as it has remained efficacious   Refilled the Emgality     Insomnia   Refilled Ambien CR 12.5 mg     Follow up in about 1 year (around 10/16/2024) for chr migraine.      Pranay Cabrera, MSN, APRN, AGACNP-BC        "

## 2023-10-16 NOTE — PROGRESS NOTES
Compliance Report   9/16/23-10/15/23  Last 30 days  Usage-(100)  Usage > 4 hrs -(97)  AHI - (2.2  )

## 2023-10-20 DIAGNOSIS — G47.00 INSOMNIA, UNSPECIFIED TYPE: ICD-10-CM

## 2023-10-20 RX ORDER — ZOLPIDEM TARTRATE 12.5 MG/1
12.5 TABLET, FILM COATED, EXTENDED RELEASE ORAL NIGHTLY
Qty: 30 TABLET | Refills: 0 | Status: SHIPPED | OUTPATIENT
Start: 2023-10-20 | End: 2023-11-21

## 2023-11-21 DIAGNOSIS — G47.00 INSOMNIA, UNSPECIFIED TYPE: ICD-10-CM

## 2023-11-21 RX ORDER — ZOLPIDEM TARTRATE 12.5 MG/1
12.5 TABLET, FILM COATED, EXTENDED RELEASE ORAL NIGHTLY
Qty: 30 TABLET | Refills: 5 | Status: SHIPPED | OUTPATIENT
Start: 2023-11-21

## 2024-05-23 DIAGNOSIS — G47.00 INSOMNIA, UNSPECIFIED TYPE: ICD-10-CM

## 2024-05-25 RX ORDER — ZOLPIDEM TARTRATE 12.5 MG/1
12.5 TABLET, FILM COATED, EXTENDED RELEASE ORAL NIGHTLY
Qty: 30 TABLET | Refills: 5 | Status: SHIPPED | OUTPATIENT
Start: 2024-05-25

## 2024-07-19 ENCOUNTER — PATIENT MESSAGE (OUTPATIENT)
Dept: NEUROLOGY | Facility: CLINIC | Age: 44
End: 2024-07-19
Payer: COMMERCIAL

## 2024-10-21 ENCOUNTER — OFFICE VISIT (OUTPATIENT)
Dept: NEUROLOGY | Facility: CLINIC | Age: 44
End: 2024-10-21
Payer: COMMERCIAL

## 2024-10-21 VITALS
WEIGHT: 135 LBS | BODY MASS INDEX: 21.69 KG/M2 | SYSTOLIC BLOOD PRESSURE: 102 MMHG | DIASTOLIC BLOOD PRESSURE: 82 MMHG | HEIGHT: 66 IN

## 2024-10-21 DIAGNOSIS — G44.019 EPISODIC CLUSTER HEADACHE, NOT INTRACTABLE: Primary | ICD-10-CM

## 2024-10-21 DIAGNOSIS — G47.00 INSOMNIA, UNSPECIFIED TYPE: ICD-10-CM

## 2024-10-21 PROCEDURE — 99999 PR PBB SHADOW E&M-EST. PATIENT-LVL III: CPT | Mod: PBBFAC,,, | Performed by: NURSE PRACTITIONER

## 2024-10-21 PROCEDURE — 1159F MED LIST DOCD IN RCRD: CPT | Mod: CPTII,S$GLB,, | Performed by: NURSE PRACTITIONER

## 2024-10-21 PROCEDURE — 1160F RVW MEDS BY RX/DR IN RCRD: CPT | Mod: CPTII,S$GLB,, | Performed by: NURSE PRACTITIONER

## 2024-10-21 PROCEDURE — 3079F DIAST BP 80-89 MM HG: CPT | Mod: CPTII,S$GLB,, | Performed by: NURSE PRACTITIONER

## 2024-10-21 PROCEDURE — 99214 OFFICE O/P EST MOD 30 MIN: CPT | Mod: S$GLB,,, | Performed by: NURSE PRACTITIONER

## 2024-10-21 PROCEDURE — 3008F BODY MASS INDEX DOCD: CPT | Mod: CPTII,S$GLB,, | Performed by: NURSE PRACTITIONER

## 2024-10-21 PROCEDURE — 3074F SYST BP LT 130 MM HG: CPT | Mod: CPTII,S$GLB,, | Performed by: NURSE PRACTITIONER

## 2024-10-21 RX ORDER — RAMELTEON 8 MG/1
8 TABLET ORAL NIGHTLY
Qty: 30 TABLET | Refills: 2 | Status: SHIPPED | OUTPATIENT
Start: 2024-10-21

## 2024-10-21 NOTE — PROGRESS NOTES
"  Neurology Follow up Note    Subjective:         Patient ID: Mary Barcenas is a 44 y.o. female.    Chief Complaint: Annual follow up for insomnia and cluster headaches    HPI:            Reports no Migraines/clusters since last visit 1 year ago. She is still taking Emgality 300 mg monthly. Has not required rescue medications.    Ambien CR 12.5 mg at bed time does help with sleep onset and maintenance; however, she is concerned about memory loss/dementia potentially associated with this medication - her father was recently dx with FTD "Picks". Asking bout alternatives. She has tried Ambien IR, CR, Trazodone.    ROS: as per HPI, otherwise pertinent systems review is negative          Past Medical History:   Diagnosis Date    Anxiety and depression     Fatigue     focal nodular hyperplasia     liver mass on MRI from 4/27/15 showed 8.6 cm lesion    Insomnia     Kidney stones     Migraines     Right flank pain     Thyroid disease        Past Surgical History:   Procedure Laterality Date    APPENDECTOMY      AUGMENTATION OF BREAST       SECTION      CHOLECYSTECTOMY      HERNIA REPAIR  2012    HYSTERECTOMY  2013    RECTAL VAGINAL FISTULECTOMY      SINUS SURGERY      TONSILLECTOMY      TUBAL LIGATION         Family History   Problem Relation Name Age of Onset    Migraines Mother      Migraines Father         Social History     Socioeconomic History    Marital status:    Tobacco Use    Smoking status: Never    Smokeless tobacco: Never    Tobacco comments:     somked occasionally as a teenager   Substance and Sexual Activity    Alcohol use: Yes    Drug use: Never     Social Drivers of Health     Financial Resource Strain: Low Risk  (2022)    Received from [facility] and Its Subsidiaries and Affiliates    Overall Financial Resource Strain (CARDIA)     Difficulty of Paying Living Expenses: Not hard at all   Transportation Needs: No Transportation Needs " "(2/13/2022)    Received from [facility] and Its Subsidiaries and Affiliates    PRAPARE - Transportation     Lack of Transportation (Medical): No     Lack of Transportation (Non-Medical): No   Housing Stability: Unknown (2/13/2022)    [facility]    Housing Stability Vital Sign     Unable to Pay for Housing in the Last Year: No     Number of Places Lived in the Last Year: 1       Review of patient's allergies indicates:   Allergen Reactions    Morphine Swelling     Throat swelling       Current Outpatient Medications   Medication Instructions    aspirin (ECOTRIN) 81 mg    dextroamphetamine-amphetamine 10 mg Tab 1 tablet    LYNN 0.1 mg/24 hr PTSW No dose, route, or frequency recorded.    galcanezumab-gnlm 300 mg/3 mL (100 mg/mL x 3) Syrg INJECT 3 PENS (300 MG) UNDER THE SKIN EVERY MONTH    levothyroxine (SYNTHROID) 25 mcg    methylphenidate HCl (RITALIN) 20 mg, Daily    sumatriptan (IMITREX STATDOSE) 6 mg    venlafaxine (EFFEXOR-XR) 150 mg    zolpidem (AMBIEN CR) 12.5 mg, Oral, Nightly       Objective:      Exam:   Visit Vitals  /82 (BP Location: Right arm)   Ht 5' 6" (1.676 m)   Wt 61.2 kg (135 lb)   BMI 21.79 kg/m²       Physical Exam  Vitals reviewed.   Constitutional:       Appearance: Normal appearance.      Accompanied by: alone   HENT:      Ears:      Comments: Hearing normal.  Eyes:      Extraocular Movements: Extraocular movements intact.   Musculoskeletal:         General: Normal range of motion.   Skin:     General: Skin is warm and dry.   Neurological:      General: No focal deficit present.      Mental Status: alert and oriented to person, place, and time.      Speech: nml     Face: symmetric     Motor: nonlateralizing     Gait: unassisted and normal.  Psychiatric:         Mood and Affect: Mood normal.         Behavior: Behavior normal.         Assessment/Plan:   1. " Episodic cluster headache, not intractable (Primary)  Out of cluster currently  Ok to stop the Emgality    2. Insomnia, unspecified type  Stop the Ambien    Start Rozerem 8 mg at bed time; Medication administration personally reviewed with patient by MD/provider. Potential or actual medication changes discussed. Common and potentially serious side effects of medications or medication changes discussed.    Tried Ambien IR/CR, Trazodone, Melatonin    Try Chamomile tea  Add Magnesium 250 mg-500 mg at bed time    Designate an 8 hour slot specifically for sleeping  Allow yourself an hour to decompress prior to bed; journal, read, prayer  The bedroom should be utilized for sleep and intimacy only  If unable to sleep, get out of bed, read, sit quietly, or prayer      Follow up in about 1 year (around 10/21/2025).      Pranay Cabrera, MSN, APRN, AGACNP-BC

## 2024-10-28 ENCOUNTER — TELEPHONE (OUTPATIENT)
Dept: NEUROLOGY | Facility: CLINIC | Age: 44
End: 2024-10-28
Payer: COMMERCIAL

## 2024-10-28 DIAGNOSIS — G43.E09 CHRONIC MIGRAINE WITH AURA: Primary | ICD-10-CM

## 2024-10-28 RX ORDER — GALCANEZUMAB 100 MG/ML
INJECTION, SOLUTION SUBCUTANEOUS
Qty: 3 ML | Refills: 11 | Status: SHIPPED | OUTPATIENT
Start: 2024-10-28

## 2025-02-06 ENCOUNTER — TELEPHONE (OUTPATIENT)
Dept: NEUROLOGY | Facility: CLINIC | Age: 45
End: 2025-02-06
Payer: COMMERCIAL

## 2025-02-06 DIAGNOSIS — G47.00 INSOMNIA, UNSPECIFIED TYPE: Primary | ICD-10-CM

## 2025-02-10 ENCOUNTER — PATIENT MESSAGE (OUTPATIENT)
Dept: NEUROLOGY | Facility: CLINIC | Age: 45
End: 2025-02-10
Payer: COMMERCIAL

## 2025-02-10 RX ORDER — DARIDOREXANT 50 MG/1
1 TABLET, FILM COATED ORAL NIGHTLY
Qty: 30 TABLET | Refills: 2 | Status: SHIPPED | OUTPATIENT
Start: 2025-02-10 | End: 2025-02-10

## 2025-02-10 RX ORDER — DARIDOREXANT 50 MG/1
1 TABLET, FILM COATED ORAL NIGHTLY
Qty: 30 TABLET | Refills: 2 | Status: SHIPPED | OUTPATIENT
Start: 2025-02-10

## 2025-02-10 NOTE — TELEPHONE ENCOUNTER
We dicussed Quviviq 50 mg at bed time; eRx to [facility] pharm    Medication administration personally reviewed with patient by MD/provider. Potential or actual medication changes discussed. Common and potentially serious side effects of medications or medication changes discussed.

## 2025-05-06 DIAGNOSIS — G47.00 INSOMNIA, UNSPECIFIED TYPE: ICD-10-CM

## 2025-05-08 RX ORDER — DARIDOREXANT 50 MG/1
1 TABLET, FILM COATED ORAL NIGHTLY
Qty: 30 TABLET | Refills: 2 | Status: SHIPPED | OUTPATIENT
Start: 2025-05-08

## 2025-05-30 DIAGNOSIS — G47.00 INSOMNIA, UNSPECIFIED TYPE: ICD-10-CM

## 2025-06-03 RX ORDER — DARIDOREXANT 50 MG/1
1 TABLET, FILM COATED ORAL NIGHTLY
Qty: 30 TABLET | Refills: 2 | Status: SHIPPED | OUTPATIENT
Start: 2025-06-03